# Patient Record
Sex: FEMALE | Race: WHITE | NOT HISPANIC OR LATINO | Employment: UNEMPLOYED | URBAN - METROPOLITAN AREA
[De-identification: names, ages, dates, MRNs, and addresses within clinical notes are randomized per-mention and may not be internally consistent; named-entity substitution may affect disease eponyms.]

---

## 2024-11-04 ENCOUNTER — PHARMACY VISIT (OUTPATIENT)
Dept: PHARMACY | Facility: MEDICAL CENTER | Age: 49
End: 2024-11-04
Payer: COMMERCIAL

## 2024-11-04 ENCOUNTER — HOSPITAL ENCOUNTER (OUTPATIENT)
Facility: MEDICAL CENTER | Age: 49
End: 2024-11-04
Attending: EMERGENCY MEDICINE | Admitting: HOSPITALIST
Payer: COMMERCIAL

## 2024-11-04 ENCOUNTER — APPOINTMENT (OUTPATIENT)
Dept: RADIOLOGY | Facility: MEDICAL CENTER | Age: 49
End: 2024-11-04
Attending: EMERGENCY MEDICINE
Payer: COMMERCIAL

## 2024-11-04 VITALS
DIASTOLIC BLOOD PRESSURE: 78 MMHG | WEIGHT: 179.68 LBS | TEMPERATURE: 98.4 F | RESPIRATION RATE: 18 BRPM | SYSTOLIC BLOOD PRESSURE: 125 MMHG | HEIGHT: 60 IN | BODY MASS INDEX: 35.28 KG/M2 | HEART RATE: 74 BPM | OXYGEN SATURATION: 94 %

## 2024-11-04 DIAGNOSIS — R93.5 ABNORMAL CT OF THE ABDOMEN: ICD-10-CM

## 2024-11-04 DIAGNOSIS — N13.39 OTHER HYDRONEPHROSIS: ICD-10-CM

## 2024-11-04 DIAGNOSIS — N20.0 NEPHROLITHIASIS: ICD-10-CM

## 2024-11-04 DIAGNOSIS — N20.1 URETEROLITHIASIS: ICD-10-CM

## 2024-11-04 PROBLEM — N13.30 HYDRONEPHROSIS: Status: ACTIVE | Noted: 2024-11-04

## 2024-11-04 LAB
ALBUMIN SERPL BCP-MCNC: 4.2 G/DL (ref 3.2–4.9)
ALBUMIN/GLOB SERPL: 1.3 G/DL
ALP SERPL-CCNC: 114 U/L (ref 30–99)
ALT SERPL-CCNC: 14 U/L (ref 2–50)
ANION GAP SERPL CALC-SCNC: 11 MMOL/L (ref 7–16)
APPEARANCE UR: CLEAR
AST SERPL-CCNC: 16 U/L (ref 12–45)
BACTERIA #/AREA URNS HPF: ABNORMAL /HPF
BASOPHILS # BLD AUTO: 0.7 % (ref 0–1.8)
BASOPHILS # BLD: 0.07 K/UL (ref 0–0.12)
BILIRUB SERPL-MCNC: 0.5 MG/DL (ref 0.1–1.5)
BILIRUB UR QL STRIP.AUTO: NEGATIVE
BUN SERPL-MCNC: 12 MG/DL (ref 8–22)
CALCIUM ALBUM COR SERPL-MCNC: 9.5 MG/DL (ref 8.5–10.5)
CALCIUM SERPL-MCNC: 9.7 MG/DL (ref 8.4–10.2)
CHLORIDE SERPL-SCNC: 104 MMOL/L (ref 96–112)
CO2 SERPL-SCNC: 21 MMOL/L (ref 20–33)
COLOR UR: YELLOW
CREAT SERPL-MCNC: 0.76 MG/DL (ref 0.5–1.4)
EKG IMPRESSION: NORMAL
EOSINOPHIL # BLD AUTO: 0.31 K/UL (ref 0–0.51)
EOSINOPHIL NFR BLD: 3 % (ref 0–6.9)
EPI CELLS #/AREA URNS HPF: ABNORMAL /HPF
ERYTHROCYTE [DISTWIDTH] IN BLOOD BY AUTOMATED COUNT: 43.5 FL (ref 35.9–50)
GFR SERPLBLD CREATININE-BSD FMLA CKD-EPI: 97 ML/MIN/1.73 M 2
GLOBULIN SER CALC-MCNC: 3.3 G/DL (ref 1.9–3.5)
GLUCOSE SERPL-MCNC: 105 MG/DL (ref 65–99)
GLUCOSE UR STRIP.AUTO-MCNC: NEGATIVE MG/DL
HCG SERPL QL: NEGATIVE
HCT VFR BLD AUTO: 44.7 % (ref 37–47)
HGB BLD-MCNC: 15.2 G/DL (ref 12–16)
IMM GRANULOCYTES # BLD AUTO: 0.04 K/UL (ref 0–0.11)
IMM GRANULOCYTES NFR BLD AUTO: 0.4 % (ref 0–0.9)
KETONES UR STRIP.AUTO-MCNC: NEGATIVE MG/DL
LEUKOCYTE ESTERASE UR QL STRIP.AUTO: NEGATIVE
LIPASE SERPL-CCNC: 27 U/L (ref 11–82)
LYMPHOCYTES # BLD AUTO: 2.26 K/UL (ref 1–4.8)
LYMPHOCYTES NFR BLD: 22.1 % (ref 22–41)
MCH RBC QN AUTO: 30.8 PG (ref 27–33)
MCHC RBC AUTO-ENTMCNC: 34 G/DL (ref 32.2–35.5)
MCV RBC AUTO: 90.5 FL (ref 81.4–97.8)
MICRO URNS: ABNORMAL
MONOCYTES # BLD AUTO: 0.85 K/UL (ref 0–0.85)
MONOCYTES NFR BLD AUTO: 8.3 % (ref 0–13.4)
NEUTROPHILS # BLD AUTO: 6.69 K/UL (ref 1.82–7.42)
NEUTROPHILS NFR BLD: 65.5 % (ref 44–72)
NITRITE UR QL STRIP.AUTO: NEGATIVE
NRBC # BLD AUTO: 0 K/UL
NRBC BLD-RTO: 0 /100 WBC (ref 0–0.2)
PH UR STRIP.AUTO: 6 [PH] (ref 5–8)
PLATELET # BLD AUTO: 318 K/UL (ref 164–446)
PMV BLD AUTO: 10.3 FL (ref 9–12.9)
POTASSIUM SERPL-SCNC: 3.6 MMOL/L (ref 3.6–5.5)
PROT SERPL-MCNC: 7.5 G/DL (ref 6–8.2)
PROT UR QL STRIP: NEGATIVE MG/DL
RBC # BLD AUTO: 4.94 M/UL (ref 4.2–5.4)
RBC # URNS HPF: ABNORMAL /HPF
RBC UR QL AUTO: ABNORMAL
SODIUM SERPL-SCNC: 136 MMOL/L (ref 135–145)
SP GR UR STRIP.AUTO: >=1.03
WBC # BLD AUTO: 10.2 K/UL (ref 4.8–10.8)
WBC #/AREA URNS HPF: ABNORMAL /HPF

## 2024-11-04 PROCEDURE — 87086 URINE CULTURE/COLONY COUNT: CPT

## 2024-11-04 PROCEDURE — 85025 COMPLETE CBC W/AUTO DIFF WBC: CPT

## 2024-11-04 PROCEDURE — 700105 HCHG RX REV CODE 258: Performed by: HOSPITALIST

## 2024-11-04 PROCEDURE — G0378 HOSPITAL OBSERVATION PER HR: HCPCS

## 2024-11-04 PROCEDURE — 36415 COLL VENOUS BLD VENIPUNCTURE: CPT

## 2024-11-04 PROCEDURE — 700111 HCHG RX REV CODE 636 W/ 250 OVERRIDE (IP): Mod: JZ | Performed by: HOSPITALIST

## 2024-11-04 PROCEDURE — 700102 HCHG RX REV CODE 250 W/ 637 OVERRIDE(OP): Performed by: INTERNAL MEDICINE

## 2024-11-04 PROCEDURE — 96375 TX/PRO/DX INJ NEW DRUG ADDON: CPT

## 2024-11-04 PROCEDURE — 700111 HCHG RX REV CODE 636 W/ 250 OVERRIDE (IP): Mod: JZ | Performed by: EMERGENCY MEDICINE

## 2024-11-04 PROCEDURE — 74177 CT ABD & PELVIS W/CONTRAST: CPT

## 2024-11-04 PROCEDURE — 80053 COMPREHEN METABOLIC PANEL: CPT

## 2024-11-04 PROCEDURE — 99418 PROLNG IP/OBS E/M EA 15 MIN: CPT | Performed by: INTERNAL MEDICINE

## 2024-11-04 PROCEDURE — 99204 OFFICE O/P NEW MOD 45 MIN: CPT | Performed by: UROLOGY

## 2024-11-04 PROCEDURE — 93005 ELECTROCARDIOGRAM TRACING: CPT

## 2024-11-04 PROCEDURE — 81001 URINALYSIS AUTO W/SCOPE: CPT

## 2024-11-04 PROCEDURE — RXMED WILLOW AMBULATORY MEDICATION CHARGE: Performed by: INTERNAL MEDICINE

## 2024-11-04 PROCEDURE — 700117 HCHG RX CONTRAST REV CODE 255: Performed by: EMERGENCY MEDICINE

## 2024-11-04 PROCEDURE — 700102 HCHG RX REV CODE 250 W/ 637 OVERRIDE(OP): Performed by: HOSPITALIST

## 2024-11-04 PROCEDURE — A9270 NON-COVERED ITEM OR SERVICE: HCPCS | Performed by: HOSPITALIST

## 2024-11-04 PROCEDURE — 93005 ELECTROCARDIOGRAM TRACING: CPT | Performed by: EMERGENCY MEDICINE

## 2024-11-04 PROCEDURE — A9270 NON-COVERED ITEM OR SERVICE: HCPCS | Performed by: INTERNAL MEDICINE

## 2024-11-04 PROCEDURE — 700105 HCHG RX REV CODE 258: Performed by: INTERNAL MEDICINE

## 2024-11-04 PROCEDURE — 99222 1ST HOSP IP/OBS MODERATE 55: CPT | Performed by: HOSPITALIST

## 2024-11-04 PROCEDURE — A9270 NON-COVERED ITEM OR SERVICE: HCPCS | Performed by: EMERGENCY MEDICINE

## 2024-11-04 PROCEDURE — 99285 EMERGENCY DEPT VISIT HI MDM: CPT

## 2024-11-04 PROCEDURE — 84703 CHORIONIC GONADOTROPIN ASSAY: CPT

## 2024-11-04 PROCEDURE — 700111 HCHG RX REV CODE 636 W/ 250 OVERRIDE (IP): Performed by: INTERNAL MEDICINE

## 2024-11-04 PROCEDURE — 83690 ASSAY OF LIPASE: CPT

## 2024-11-04 PROCEDURE — 700102 HCHG RX REV CODE 250 W/ 637 OVERRIDE(OP): Performed by: EMERGENCY MEDICINE

## 2024-11-04 PROCEDURE — 96374 THER/PROPH/DIAG INJ IV PUSH: CPT

## 2024-11-04 RX ORDER — ACETAMINOPHEN 325 MG/1
650 TABLET ORAL EVERY 6 HOURS PRN
Status: DISCONTINUED | OUTPATIENT
Start: 2024-11-04 | End: 2024-11-04 | Stop reason: HOSPADM

## 2024-11-04 RX ORDER — SODIUM CHLORIDE 9 MG/ML
INJECTION, SOLUTION INTRAVENOUS
Status: DISCONTINUED
Start: 2024-11-04 | End: 2024-11-04

## 2024-11-04 RX ORDER — PROMETHAZINE HYDROCHLORIDE 25 MG/1
12.5-25 SUPPOSITORY RECTAL EVERY 4 HOURS PRN
Status: DISCONTINUED | OUTPATIENT
Start: 2024-11-04 | End: 2024-11-04 | Stop reason: HOSPADM

## 2024-11-04 RX ORDER — ONDANSETRON 2 MG/ML
4 INJECTION INTRAMUSCULAR; INTRAVENOUS EVERY 4 HOURS PRN
Status: DISCONTINUED | OUTPATIENT
Start: 2024-11-04 | End: 2024-11-04 | Stop reason: HOSPADM

## 2024-11-04 RX ORDER — ONDANSETRON 4 MG/1
4 TABLET, ORALLY DISINTEGRATING ORAL EVERY 4 HOURS PRN
Status: DISCONTINUED | OUTPATIENT
Start: 2024-11-04 | End: 2024-11-04 | Stop reason: HOSPADM

## 2024-11-04 RX ORDER — KETOROLAC TROMETHAMINE 10 MG/1
10 TABLET, FILM COATED ORAL EVERY 6 HOURS PRN
Qty: 20 TABLET | Refills: 0 | Status: SHIPPED | OUTPATIENT
Start: 2024-11-04 | End: 2024-11-09

## 2024-11-04 RX ORDER — SODIUM CHLORIDE 9 MG/ML
500 INJECTION, SOLUTION INTRAVENOUS ONCE
Status: COMPLETED | OUTPATIENT
Start: 2024-11-04 | End: 2024-11-04

## 2024-11-04 RX ORDER — IBUPROFEN 200 MG
400-800 TABLET ORAL EVERY 6 HOURS PRN
Status: ON HOLD | COMMUNITY
End: 2024-11-04

## 2024-11-04 RX ORDER — HYDROMORPHONE HYDROCHLORIDE 1 MG/ML
0.5 INJECTION, SOLUTION INTRAMUSCULAR; INTRAVENOUS; SUBCUTANEOUS
Status: DISCONTINUED | OUTPATIENT
Start: 2024-11-04 | End: 2024-11-04

## 2024-11-04 RX ORDER — PROCHLORPERAZINE EDISYLATE 5 MG/ML
5-10 INJECTION INTRAMUSCULAR; INTRAVENOUS EVERY 4 HOURS PRN
Status: DISCONTINUED | OUTPATIENT
Start: 2024-11-04 | End: 2024-11-04 | Stop reason: HOSPADM

## 2024-11-04 RX ORDER — PROMETHAZINE HYDROCHLORIDE 25 MG/1
12.5-25 TABLET ORAL EVERY 4 HOURS PRN
Status: DISCONTINUED | OUTPATIENT
Start: 2024-11-04 | End: 2024-11-04 | Stop reason: HOSPADM

## 2024-11-04 RX ORDER — KETOROLAC TROMETHAMINE 15 MG/ML
15 INJECTION, SOLUTION INTRAMUSCULAR; INTRAVENOUS EVERY 6 HOURS
Status: DISCONTINUED | OUTPATIENT
Start: 2024-11-04 | End: 2024-11-04 | Stop reason: HOSPADM

## 2024-11-04 RX ORDER — ACETAMINOPHEN 500 MG
500 TABLET ORAL EVERY 6 HOURS PRN
COMMUNITY

## 2024-11-04 RX ORDER — HYDROMORPHONE HYDROCHLORIDE 1 MG/ML
1 INJECTION, SOLUTION INTRAMUSCULAR; INTRAVENOUS; SUBCUTANEOUS
Status: DISCONTINUED | OUTPATIENT
Start: 2024-11-04 | End: 2024-11-04

## 2024-11-04 RX ORDER — KETOROLAC TROMETHAMINE 10 MG/1
10 TABLET, FILM COATED ORAL EVERY 6 HOURS PRN
Qty: 20 TABLET | Refills: 0 | Status: SHIPPED | OUTPATIENT
Start: 2024-11-04 | End: 2024-11-04

## 2024-11-04 RX ORDER — ALUMINA, MAGNESIA, AND SIMETHICONE 2400; 2400; 240 MG/30ML; MG/30ML; MG/30ML
20 SUSPENSION ORAL ONCE
Status: COMPLETED | OUTPATIENT
Start: 2024-11-04 | End: 2024-11-04

## 2024-11-04 RX ORDER — TAMSULOSIN HYDROCHLORIDE 0.4 MG/1
0.4 CAPSULE ORAL
Status: DISCONTINUED | OUTPATIENT
Start: 2024-11-04 | End: 2024-11-04 | Stop reason: HOSPADM

## 2024-11-04 RX ORDER — TAMSULOSIN HYDROCHLORIDE 0.4 MG/1
0.4 CAPSULE ORAL
Qty: 30 CAPSULE | Refills: 1 | Status: SHIPPED | OUTPATIENT
Start: 2024-11-05 | End: 2024-12-05

## 2024-11-04 RX ORDER — KETOROLAC TROMETHAMINE 15 MG/ML
15 INJECTION, SOLUTION INTRAMUSCULAR; INTRAVENOUS ONCE
Status: COMPLETED | OUTPATIENT
Start: 2024-11-04 | End: 2024-11-04

## 2024-11-04 RX ORDER — POTASSIUM CITRATE 10 MEQ/1
10 TABLET, EXTENDED RELEASE ORAL 2 TIMES DAILY
Qty: 60 TABLET | Refills: 1 | Status: SHIPPED | OUTPATIENT
Start: 2024-11-04 | End: 2025-01-03

## 2024-11-04 RX ORDER — ONDANSETRON 2 MG/ML
4 INJECTION INTRAMUSCULAR; INTRAVENOUS ONCE
Status: COMPLETED | OUTPATIENT
Start: 2024-11-04 | End: 2024-11-04

## 2024-11-04 RX ADMIN — IOHEXOL 100 ML: 350 INJECTION, SOLUTION INTRAVENOUS at 03:22

## 2024-11-04 RX ADMIN — KETOROLAC TROMETHAMINE 15 MG: 15 INJECTION, SOLUTION INTRAMUSCULAR; INTRAVENOUS at 12:16

## 2024-11-04 RX ADMIN — CEFAZOLIN 1 G: 1 INJECTION, POWDER, FOR SOLUTION INTRAMUSCULAR; INTRAVENOUS at 12:27

## 2024-11-04 RX ADMIN — TAMSULOSIN HYDROCHLORIDE 0.4 MG: 0.4 CAPSULE ORAL at 12:15

## 2024-11-04 RX ADMIN — ACETAMINOPHEN 650 MG: 325 TABLET ORAL at 10:33

## 2024-11-04 RX ADMIN — ONDANSETRON 4 MG: 2 INJECTION INTRAMUSCULAR; INTRAVENOUS at 06:40

## 2024-11-04 RX ADMIN — KETOROLAC TROMETHAMINE 15 MG: 15 INJECTION, SOLUTION INTRAMUSCULAR; INTRAVENOUS at 02:18

## 2024-11-04 RX ADMIN — ALUMINUM HYDROXIDE, MAGNESIUM HYDROXIDE, AND DIMETHICONE 20 ML: 400; 400; 40 SUSPENSION ORAL at 02:17

## 2024-11-04 RX ADMIN — KETOROLAC TROMETHAMINE 15 MG: 15 INJECTION, SOLUTION INTRAMUSCULAR; INTRAVENOUS at 06:40

## 2024-11-04 RX ADMIN — SODIUM CHLORIDE 500 ML: 9 INJECTION, SOLUTION INTRAVENOUS at 08:26

## 2024-11-04 RX ADMIN — ONDANSETRON 4 MG: 2 INJECTION INTRAMUSCULAR; INTRAVENOUS at 02:17

## 2024-11-04 SDOH — ECONOMIC STABILITY: TRANSPORTATION INSECURITY
IN THE PAST 12 MONTHS, HAS THE LACK OF TRANSPORTATION KEPT YOU FROM MEDICAL APPOINTMENTS OR FROM GETTING MEDICATIONS?: NO

## 2024-11-04 SDOH — ECONOMIC STABILITY: TRANSPORTATION INSECURITY
IN THE PAST 12 MONTHS, HAS LACK OF RELIABLE TRANSPORTATION KEPT YOU FROM MEDICAL APPOINTMENTS, MEETINGS, WORK OR FROM GETTING THINGS NEEDED FOR DAILY LIVING?: NO

## 2024-11-04 ASSESSMENT — FIBROSIS 4 INDEX: FIB4 SCORE: 0.62

## 2024-11-04 ASSESSMENT — SOCIAL DETERMINANTS OF HEALTH (SDOH)
IN THE PAST 12 MONTHS, HAS THE ELECTRIC, GAS, OIL, OR WATER COMPANY THREATENED TO SHUT OFF SERVICE IN YOUR HOME?: NO
WITHIN THE LAST YEAR, HAVE YOU BEEN HUMILIATED OR EMOTIONALLY ABUSED IN OTHER WAYS BY YOUR PARTNER OR EX-PARTNER?: NO
WITHIN THE PAST 12 MONTHS, YOU WORRIED THAT YOUR FOOD WOULD RUN OUT BEFORE YOU GOT THE MONEY TO BUY MORE: NEVER TRUE
WITHIN THE PAST 12 MONTHS, THE FOOD YOU BOUGHT JUST DIDN'T LAST AND YOU DIDN'T HAVE MONEY TO GET MORE: NEVER TRUE
WITHIN THE LAST YEAR, HAVE YOU BEEN AFRAID OF YOUR PARTNER OR EX-PARTNER?: NO
WITHIN THE LAST YEAR, HAVE TO BEEN RAPED OR FORCED TO HAVE ANY KIND OF SEXUAL ACTIVITY BY YOUR PARTNER OR EX-PARTNER?: NO
WITHIN THE LAST YEAR, HAVE YOU BEEN KICKED, HIT, SLAPPED, OR OTHERWISE PHYSICALLY HURT BY YOUR PARTNER OR EX-PARTNER?: NO

## 2024-11-04 ASSESSMENT — ENCOUNTER SYMPTOMS
PALPITATIONS: 0
FLANK PAIN: 1
SHORTNESS OF BREATH: 0
DOUBLE VISION: 0
DEPRESSION: 0
FEVER: 0
INSOMNIA: 0
HEADACHES: 0
SORE THROAT: 0
NECK PAIN: 0
ABDOMINAL PAIN: 1
WEAKNESS: 0
COUGH: 0
BLURRED VISION: 0
DIZZINESS: 0
NAUSEA: 1
BRUISES/BLEEDS EASILY: 0
VOMITING: 0
CHILLS: 1
MYALGIAS: 0

## 2024-11-04 ASSESSMENT — PATIENT HEALTH QUESTIONNAIRE - PHQ9
SUM OF ALL RESPONSES TO PHQ9 QUESTIONS 1 AND 2: 0
1. LITTLE INTEREST OR PLEASURE IN DOING THINGS: NOT AT ALL
2. FEELING DOWN, DEPRESSED, IRRITABLE, OR HOPELESS: NOT AT ALL

## 2024-11-04 ASSESSMENT — COGNITIVE AND FUNCTIONAL STATUS - GENERAL
SUGGESTED CMS G CODE MODIFIER DAILY ACTIVITY: CH
MOBILITY SCORE: 24
SUGGESTED CMS G CODE MODIFIER MOBILITY: CH
DAILY ACTIVITIY SCORE: 24

## 2024-11-04 ASSESSMENT — PAIN DESCRIPTION - PAIN TYPE
TYPE: ACUTE PAIN

## 2024-11-04 ASSESSMENT — PAIN DESCRIPTION - DESCRIPTORS: DESCRIPTORS: CRAMPING

## 2024-11-04 ASSESSMENT — LIFESTYLE VARIABLES
TOTAL SCORE: 0
CONSUMPTION TOTAL: NEGATIVE
ALCOHOL_USE: YES
DOES PATIENT WANT TO STOP DRINKING: NO
TOTAL SCORE: 0
EVER FELT BAD OR GUILTY ABOUT YOUR DRINKING: NO
HOW MANY TIMES IN THE PAST YEAR HAVE YOU HAD 5 OR MORE DRINKS IN A DAY: 0
HAVE PEOPLE ANNOYED YOU BY CRITICIZING YOUR DRINKING: NO
AVERAGE NUMBER OF DAYS PER WEEK YOU HAVE A DRINK CONTAINING ALCOHOL: 0
HAVE YOU EVER FELT YOU SHOULD CUT DOWN ON YOUR DRINKING: NO
TOTAL SCORE: 0
ON A TYPICAL DAY WHEN YOU DRINK ALCOHOL HOW MANY DRINKS DO YOU HAVE: 1
EVER HAD A DRINK FIRST THING IN THE MORNING TO STEADY YOUR NERVES TO GET RID OF A HANGOVER: NO

## 2024-11-04 NOTE — ED TRIAGE NOTES
"Chief Complaint   Patient presents with    Abdominal Pain    Flank Pain    Headache     Pt walks in with c/o lt flank back pain,  lower chest and abd pain and pain in chest that increases with  deep breathing. Pt states she has had a \"terrible\" HA for 3-4 days and is nauseates at this time.     BP (!) 141/89   Pulse 93   Temp 36.4 °C (97.6 °F) (Temporal)   Resp 18   Ht 1.524 m (5')   Wt 82.1 kg (181 lb)   SpO2 97%   BMI 35.35 kg/m²     "

## 2024-11-04 NOTE — ED PROVIDER NOTES
"ED Provider Note        CHIEF COMPLAINT  Chief Complaint   Patient presents with    Abdominal Pain    Flank Pain    Headache     Pt walks in with c/o lt flank back pain,  lower chest and abd pain and pain in chest that increases with  deep breathing. Pt states she has had a \"terrible\" HA for 3-4 days and is nauseates at this time.         HPI      Stephanie Gail Schoenfeldt is a 48 y.o. female who presents to the Emergency Department with left-sided abdominal pain.  The patient reports that for approximately 4 days she has been having worsening pain after eating.  She also endorses nausea but denies any vomiting.  She is recently and having some migraine headaches.  Her symptoms improved within the past 24 hours and is having no pain until the afternoon when she began having some mild abdominal discomfort which markedly worsened around midnight when she was feeling hot and had severe pain in the left-hand side.  She is taken 1 tablet of ibuprofen once or twice a day for her symptoms but no other medications.  She denies any melena, hematochezia, dysuria, hematuria.  She does have a history of kidney stones but this does not feel similar to that.    REVIEW OF SYSTEMS  See HPI for further details. All other systems are negative.     PAST MEDICAL HISTORY   History reviewed. No pertinent past medical history.    SURGICAL HISTORY  History reviewed. No pertinent surgical history.    FAMILY HISTORY  History reviewed. No pertinent family history.    SOCIAL HISTORY        CURRENT MEDICATIONS  Home Medications       Reviewed by Coby Amos R.N. (Registered Nurse) on 11/04/24 at 0159  Med List Status: <None>     Medication Last Dose Status        Patient Brett Taking any Medications                         Audit from Redirected Encounters    **Home medications have not yet been reviewed for this encounter**         ALLERGIES  Allergies   Allergen Reactions    Morphine Unspecified     Chest pain       PHYSICAL EXAM  VITAL " SIGNS: /83   Pulse 82   Temp 36.8 °C (98.2 °F)   Resp 18   Ht 1.524 m (5')   Wt 82.1 kg (181 lb)   SpO2 94%   BMI 35.35 kg/m²   Gen: Alert, uncomfortable appearing  HEENT: ATNC  Eyes: PERRL, EOMI, normal conjunctiva  Neck: trachea midline  Resp: no respiratory distress  CV: No JVD, regular rate and rhythm  Abd: non-distended, soft, left upper quadrant tenderness, minimal right upper quadrant tenderness.  No rebound or guarding.  : No CVAT  Ext: No deformities  Neuro: speech fluent    DIAGNOSTIC STUDIES / PROCEDURES  EKG  Results for orders placed or performed during the hospital encounter of 24   EKG (Now)   Result Value Ref Range    Report       [facility] Emergency Dept.    Test Date:  2024  Pt Name:    STEPHANIE SCHOENFELDT        Department: [facility]  MRN:        [id number]                      [facility]  Gender:     Female                       Technician: [id number]  :        1975                   Requested By:ER TRIAGE PROTOCOL  Order #:    [id number]                    Reading MD: Anjum Keenan    Measurements  Intervals                                Axis  Rate:       84                           P:          28  MD:         152                          QRS:        28  QRSD:       96                           T:          34  QT:         355  QTc:        420    Interpretive Statements  Sinus rhythm  Probable left atrial enlargement  Low voltage, precordial leads  Baseline wander in lead(s) I,II,aVR,aVL,aVF  No previous ECG available for comparison  Electronically Signed On 2024 02:34:51 PST by Anjum Keenan       I independently interpreted this EKG    LABS  Labs Reviewed   COMP METABOLIC PANEL - Abnormal; Notable for the following components:       Result Value    Glucose 105 (*)     Alkaline Phosphatase 114 (*)     All other components within normal limits   URINALYSIS - Abnormal; Notable for the following components:    Occult Blood Large (*)      All other components within normal limits   URINE MICROSCOPIC (W/UA) - Abnormal; Notable for the following components:    Bacteria Moderate (*)     All other components within normal limits   CBC WITH DIFFERENTIAL   LIPASE   HCG QUAL SERUM   ESTIMATED GFR         RADIOLOGY  I have independently interpreted the diagnostic imaging associated with this visit.  My preliminary interpretation is as follows: Proximal left ureteral stone  Radiologist interpretation:    CT-ABDOMEN-PELVIS WITH   Final Result         1. 5 mm proximal left ureteral stone.   2. 6 mm nonobstructing left renal stone.   3. There are few sclerotic foci within the spine and pelvis, bone islands versus other. Recommend follow-up bone scan.          COURSE & MEDICAL DECISION MAKING  Pertinent Labs & Imaging studies were reviewed. (See chart for details)      INITIAL ASSESSMENT AND PLAN  Care Narrative: Patient presents with left upper quadrant abdominal pain and tenderness, initially worse after eating.  Not obviously peritonitic on exam.  There is no CVA tenderness for pyelonephritis and urinalysis demonstrates bacteria but no elevated white blood cells.  The patient does have elevated red blood cells however this is not flag red on the urinalysis result.  The patient does report that she always has stones within her kidney and has chronic hematuria, has seen urology in the past but not for the past 3 years since she has been here in [place].      She demonstrates no leukocytosis, no evidence of anemia.  No elevated lipase for pancreatitis.  No significant LFT abnormalities to suggest acute cholecystitis, ascending cholangitis.  No electrolyte derangements.    After Maalox, ketorolac, her pain is 1/10 in severity.    The patient's CAT scan demonstrates a proximal ureteral stone.  In discussing with the patient, she has required multiple urologic interventions in the past including for proximal stones and has a history of recurrent infected stones.  Given  this and the unlikely probability the patient will be able to pass the stone on her own given 4 days of symptoms and the stone remains proximal, the patient was offered either outpatient close follow-up or hospitalization and she elects for hospitalization for urgent intervention.    ADDITIONAL PROBLEM LIST AND DISPOSITION  I have discussed management of the patient with the following medical professionals: See below    Barriers to care at this time, including but not limited to: Patient does not have established PCP.     Patient is referred to primary care provider for blood pressure, diabetes and all other preventative health services.  Patient was given return precautions, anticipatory guidance, and the opportunity ask questions prior to discharge        FINAL IMPRESSION  1. Ureterolithiasis    2. Abnormal CT of the abdomen        DISPOSITION:    Case discussed with Dr. Mitchell , who will evaluate the patient for hospitalization. Patient will be hospitalized in guarded condition.        This dictation was created using voice recognition software. The accuracy of the dictation is limited to the abilities of the software. I expect there may be some errors of grammar and possibly content. The nursing notes were reviewed and certain aspects of this information were incorporated into this note.

## 2024-11-04 NOTE — CARE PLAN
The patient is Stable - Low risk of patient condition declining or worsening         Progress made toward(s) clinical / shift goals:  monitor pain level  Problem: Pain - Standard  Goal: Alleviation of pain or a reduction in pain to the patient’s comfort goal  11/4/2024 0953 by Sagrario Camacho R.N.  Outcome: Progressing  11/4/2024 0952 by Sagrario Camacho R.N.  Outcome: Progressing     Problem: Knowledge Deficit - Standard  Goal: Patient and family/care givers will demonstrate understanding of plan of care, disease process/condition, diagnostic tests and medications  11/4/2024 0953 by Sagrario Camacho R.N.  Outcome: Progressing  11/4/2024 0952 by Sagrario Camacho R.N.  Outcome: Progressing     Problem: Psychosocial  Goal: Patient's level of anxiety will decrease  11/4/2024 0953 by Sagrario Camacho R.N.  Outcome: Progressing  11/4/2024 0952 by Sagrario Camacho R.N.  Reactivated  Goal: Patient's ability to verbalize feelings about condition will improve  11/4/2024 0953 by Sagrario Camacho R.N.  Outcome: Progressing  11/4/2024 0952 by Sagrario Camacho R.N.  Reactivated  Goal: Patient's ability to re-evaluate and adapt role responsibilities will improve  11/4/2024 0953 by Sagrario Camacho R.N.  Outcome: Progressing  11/4/2024 0952 by Sagrario Camacho R.N.  Reactivated  Goal: Patient and family will demonstrate ability to cope with life altering diagnosis and/or procedure  11/4/2024 0953 by Sagrario Camacho R.N.  Outcome: Progressing  11/4/2024 0952 by Sagrario Camacho R.N.  Reactivated  Goal: Spiritual and cultural needs incorporated into hospitalization  11/4/2024 0953 by Sagrario Camacho R.N.  Outcome: Progressing  11/4/2024 0952 by Sagrario Camacho R.N.  Reactivated       Patient is not progressing towards the following goals:

## 2024-11-04 NOTE — PROGRESS NOTES
Hospital Medicine Daily Progress Note    Date of Service  11/4/2024    Patient stating she is having ongoing left flank pain but tolerable with IV Toradol.  She is reporting severe headaches.  UA showing bacteria but no WBC.  Urine appears to be cloudy in bathroom toilet hat.  Patient reporting significant history of nephrolithiasis, prior procedures with cystoscopies, lithotripsy, ureteral stents.  Her last ureteral stents were placed bilaterally in [facility], about 4 years ago now.  She has not needed a urologist in [place] since moving here about 3 years ago.  I reviewed CT scan, there is a 5 mm left ureteral stone, 6 mm left renal stone.  There is minimal hydronephrosis.  I consulted [facility] urology for evaluation.   Keep n.p.o. until evaluation  Hold further IV fluids  I am starting IV Ancef, and preparation if patient has infection proximal to the ureteral stone.    Principal Problem:    Nephrolithiasis (POA: Yes)  Active Problems:    Hydronephrosis (POA: Unknown)  Resolved Problems:    * No resolved hospital problems. *        I spent an extra 15 minutes at patient's bedside discussing her case, therapy needs and consultation with urology

## 2024-11-04 NOTE — H&P
"Hospital Medicine History & Physical Note    Date of Service  11/4/2024    Primary Care Physician  Pcp Pt States None    Consultants  None    Code Status  Full Code    Chief Complaint  Chief Complaint   Patient presents with    Abdominal Pain    Flank Pain    Headache     Pt walks in with c/o lt flank back pain,  lower chest and abd pain and pain in chest that increases with  deep breathing. Pt states she has had a \"terrible\" HA for 3-4 days and is nauseates at this time.       History of Presenting Illness  Stephanie Gail Schoenfeldt is a 48 y.o. female, who presented to the emergency department on 11/4/2024 with complaints of acute left flank pain that is started 5 days ago and is getting progressively worse.  She has associated nausea.  Pain is sharp in nature, worse with deep inspiration.  She denies having fevers, no dysuria, but just noticed hematuria last time she used the restroom. She had multiple kidney stones, prior h/o 10 procedure including Ureteral stent , last episode 4 years ago. She was previously leaving in [facility]. Moved to [place] 3 years ago.     I discussed the plan of care with patient and ERP Dr. Keenan .    Review of Systems  Review of Systems   Constitutional:  Positive for chills and malaise/fatigue. Negative for fever.   HENT:  Negative for congestion and sore throat.    Eyes:  Negative for blurred vision and double vision.   Respiratory:  Negative for cough and shortness of breath.    Cardiovascular:  Negative for chest pain and palpitations.   Gastrointestinal:  Positive for abdominal pain and nausea. Negative for vomiting.   Genitourinary:  Positive for flank pain. Negative for dysuria and urgency.   Musculoskeletal:  Negative for myalgias and neck pain.   Skin:  Negative for itching and rash.   Neurological:  Negative for dizziness, weakness and headaches.   Endo/Heme/Allergies:  Does not bruise/bleed easily.   Psychiatric/Behavioral:  Negative for depression. The patient does not " have insomnia.        Past Medical History  Kidney stones    Surgical History  Lithotripsy,  ureteral stents    Family History  Reviewed and not pertinent  Family history reviewed with patient. There is no family history that is pertinent to the chief complaint.     Social History   Denies smoking tobacco.  Does not drink daily.  Also denies recreational drug use.    Allergies  Allergies   Allergen Reactions    Morphine Unspecified     Chest pain       Medications  None       Physical Exam  Temp:  [36.4 °C (97.6 °F)-36.8 °C (98.2 °F)] 36.8 °C (98.2 °F)  Pulse:  [82-93] 82  Resp:  [18] 18  BP: (124-141)/(83-89) 124/83  SpO2:  [94 %-97 %] 94 %  Blood Pressure: 124/83   Temperature: 36.8 °C (98.2 °F)   Pulse: 82   Respiration: 18   Pulse Oximetry: 94 %       Physical Exam  Constitutional:       Appearance: Normal appearance.   HENT:      Head: Normocephalic and atraumatic.      Mouth/Throat:      Mouth: Mucous membranes are moist.      Pharynx: Oropharynx is clear.   Eyes:      Extraocular Movements: Extraocular movements intact.      Pupils: Pupils are equal, round, and reactive to light.   Cardiovascular:      Rate and Rhythm: Normal rate and regular rhythm.      Heart sounds: Normal heart sounds.   Pulmonary:      Effort: Pulmonary effort is normal.      Breath sounds: Normal breath sounds.   Abdominal:      General: Abdomen is flat. Bowel sounds are normal.      Palpations: Abdomen is soft.      Tenderness: There is left CVA tenderness.   Musculoskeletal:      Cervical back: Normal range of motion and neck supple.   Skin:     General: Skin is warm and dry.   Neurological:      General: No focal deficit present.      Mental Status: She is alert and oriented to person, place, and time.   Psychiatric:         Mood and Affect: Mood normal.         Behavior: Behavior normal.         Laboratory:  Recent Labs     11/04/24  0145   WBC 10.2   RBC 4.94   HEMOGLOBIN 15.2   HEMATOCRIT 44.7   MCV 90.5   MCH 30.8   MCHC 34.0    RDW 43.5   PLATELETCT 318   MPV 10.3     Recent Labs     11/04/24  0145   SODIUM 136   POTASSIUM 3.6   CHLORIDE 104   CO2 21   GLUCOSE 105*   BUN 12   CREATININE 0.76   CALCIUM 9.7     Recent Labs     11/04/24  0145   ALTSGPT 14   ASTSGOT 16   ALKPHOSPHAT 114*   TBILIRUBIN 0.5   LIPASE 27   GLUCOSE 105*             Imaging:  CT-ABDOMEN-PELVIS WITH   Final Result         1. 5 mm proximal left ureteral stone.   2. 6 mm nonobstructing left renal stone.   3. There are few sclerotic foci within the spine and pelvis, bone islands versus other. Recommend follow-up bone scan.          X-Ray:  I have personally reviewed the images and compared with prior images. and My impression is: CT of the abdomen and pelvis is showing 5 mm stone proximal to the left ureter.  She has minimal hydronephrosis.  There is also a 6 mm nonobstructing left renal stone seen.    Assessment/Plan:  Justification for Admission Status  I anticipate this patient is appropriate for observation status at this time because 48-year-old female with urolithiasis.  And intractable pain        * Nephrolithiasis- (present on admission)  Assessment & Plan  -Observation status to medical floor.  -Patient with 6 mm stone to the left proximal ureter. Symptoms now for 5 days. Multiple kidney stones in the past requiring procedures.  -She has intractable pain in spite of receiving IV narcotics.  -There is no UTI, no SIRS criteria.  -Allergic to IV narcotics. I scheduled Toradol Q 6 hours for pain control  -Please call Urology consult in am      Hydronephrosis  Assessment & Plan  -Minimal secondary to obstructive changes.  -Plan as above.        VTE prophylaxis: SCDs/TEDs

## 2024-11-04 NOTE — ASSESSMENT & PLAN NOTE
-Observation status to medical floor.  -Patient with 6 mm stone to the left proximal ureter. Symptoms now for 5 days. Multiple kidney stones in the past requiring procedures.  -She has intractable pain in spite of receiving IV narcotics.  -There is no UTI, no SIRS criteria.  -Allergic to IV narcotics. I scheduled Toradol Q 6 hours for pain control  -Please call Urology consult in am

## 2024-11-04 NOTE — ED NOTES
Patient ambulates to the ER accompanied by her  complaining of LLQ abdominal pain the radiates to her left flank. The patient reports that her pain increases when she eats food and that but denies nausea or vomiting. She reports a history of kidney stones but denies noticing any urinary abnormalities.

## 2024-11-04 NOTE — PROGRESS NOTES
4 Eyes Skin Assessment Completed by VIRIDIANA martinez and VIRIDIANA white.    Head WDL  Ears WDL  Nose WDL  Mouth WDL  Neck WDL  Breast/Chest WDL  Shoulder Blades WDL  Spine WDL  (R) Arm/Elbow/Hand WDL  (L) Arm/Elbow/Hand WDL  Abdomen WDL  Groin WDL  Scrotum/Coccyx/Buttocks WDL  (R) Leg WDL  (L) Leg WDL  (R) Heel/Foot/Toe WDL  (L) Heel/Foot/Toe WDL          Devices In Places       Interventions In Place N/A    Possible Skin Injury No    Pictures Uploaded Into Epic N/A  Wound Consult Placed N/A  RN Wound Prevention Protocol Ordered No

## 2024-11-05 ENCOUNTER — TELEPHONE (OUTPATIENT)
Dept: UROLOGY | Facility: MEDICAL CENTER | Age: 49
End: 2024-11-05
Payer: COMMERCIAL

## 2024-11-05 ENCOUNTER — PATIENT MESSAGE (OUTPATIENT)
Dept: UROLOGY | Facility: MEDICAL CENTER | Age: 49
End: 2024-11-05
Payer: COMMERCIAL

## 2024-11-05 DIAGNOSIS — N20.0 NEPHROLITHIASIS: ICD-10-CM

## 2024-11-05 NOTE — PROGRESS NOTES
Patient discharged via wheelchair with spouse.  Discharge documents, medication prescriptions given at bedside, and discharge instructions given.

## 2024-11-05 NOTE — DISCHARGE INSTRUCTIONS
Avoid iron pills, typically contains Oxylate which can form renal stones (Calcium-Oxylate stones).  Please stay hydrated.

## 2024-11-05 NOTE — TELEPHONE ENCOUNTER
VOICEMAIL  1. Caller Name: Stephanie Gail Schoenfeldt                      Call Back Number: [phone]    2. Message: pt called and lm wanting to know what diet she should be following for her kidney stones.    3. Patient approves office to leave a detailed voicemail/MyChart message: yes

## 2024-11-05 NOTE — DISCHARGE SUMMARY
"Discharge Summary    CHIEF COMPLAINT ON ADMISSION  Chief Complaint   Patient presents with    Abdominal Pain    Flank Pain    Headache     Pt walks in with c/o lt flank back pain,  lower chest and abd pain and pain in chest that increases with  deep breathing. Pt states she has had a \"terrible\" HA for 3-4 days and is nauseates at this time.       Reason for Admission  Flank Pain     Admission Date  11/4/2024    CODE STATUS  Full Code    HPI & HOSPITAL COURSE  This is \"Stephanie Gail Schoenfeldt is a 48 y.o. female, who presented to the emergency department on 11/4/2024 with complaints of acute left flank pain that is started 5 days ago and is getting progressively worse.  She has associated nausea.  Pain is sharp in nature, worse with deep inspiration.  She denies having fevers, no dysuria, but just noticed hematuria last time she used the restroom. She had multiple kidney stones, prior h/o 10 procedure including Ureteral stent , last episode 4 years ago. She was previously leaving in [facility]. Moved to [place] 3 years ago.\" (As per admitting physician Dr. Mitchell on H&P).    Patient stating she is having ongoing left flank pain but tolerable with IV Toradol.  She is reporting severe headaches.  UA showing bacteria but no WBC.  Urine appears to be cloudy in bathroom toilet hat.    Patient reporting significant history of nephrolithiasis, prior procedures with cystoscopies, lithotripsy, ureteral stents.  Her last ureteral stents were placed bilaterally in [facility], about 4 years ago now.  She has not needed a urologist in [place] since moving here about 3 years ago.    I reviewed CT scan, there is a 5 mm left ureteral stone, 6 mm left renal stone.  There is minimal hydronephrosis.    I consulted [facility] urology, discussed with Dr. Craig Nathan.  Patient is doing well, her stone has a good chance to pass.  He recommended to continue on tamsulosin for 30 days, potassium citrate 15 mEq twice daily for 30 days, " Toradol short course for pain control.    I was able to prescribe patient tamsulosin and Toradol.  Our pharmacy was caring for potassium citrate 10mEq which I ordered.       Therefore, she is discharged in good and stable condition to home with close outpatient follow-up.  Admitted and discharged same day, under observation.    The patient recovered much more quickly than anticipated on admission.    Discharge Date  11/4/2024    FOLLOW UP ITEMS POST DISCHARGE  With primary care provider  With [facility]    DISCHARGE DIAGNOSES  Principal Problem:    Nephrolithiasis (POA: Yes)  Active Problems:    Hydronephrosis (POA: Yes)    Left ureteral stone (POA: Yes)  Resolved Problems:    * No resolved hospital problems. *      FOLLOW UP  No future appointments.  Craig Nathan M.D.  [address] [place] [address] [address] [place] [phone] [phone]    Call  Please call to ensure you have your appointment set up to follow-up the left ureteral stone      MEDICATIONS ON DISCHARGE     Medication List        START taking these medications        Instructions   ketorolac 10 MG Tabs  Commonly known as: Toradol   Take 1 Tablet by mouth every 6 hours as needed for Severe Pain for up to 5 days.  Dose: 10 mg     potassium citrate SR 10 MEQ (1080 MG) Tbcr  Commonly known as: Urocit-K SR   Take 1 Tablet by mouth 2 times a day for 30 days.  Dose: 10 mEq     tamsulosin 0.4 MG capsule  Start taking on: November 5, 2024  Commonly known as: Flomax   Take 1 Capsule by mouth 1/2 hour after breakfast for 30 days.  Dose: 0.4 mg            CONTINUE taking these medications        Instructions   acetaminophen 500 MG Tabs  Commonly known as: Tylenol   Take 500 mg by mouth every 6 hours as needed. Indications: Pain  Dose: 500 mg            STOP taking these medications      BISMUTH SUBSALICYLATE PO     COLLAGEN PO     ibuprofen 200 MG Tabs  Commonly known as: Motrin     IRON PO              Allergies  Allergies   Allergen Reactions    Dilaudid  [Hydromorphone] Vomiting    Morphine Unspecified     Chest pain       DIET  Orders Placed This Encounter   Procedures    Diet Order Diet: Regular     Standing Status:   Standing     Number of Occurrences:   1     Order Specific Question:   Diet:     Answer:   Regular [1]       ACTIVITY  As tolerated.  Weight bearing as tolerated    CONSULTATIONS  [facility]    PROCEDURES  None    LABORATORY  Lab Results   Component Value Date    SODIUM 136 11/04/2024    POTASSIUM 3.6 11/04/2024    CHLORIDE 104 11/04/2024    CO2 21 11/04/2024    GLUCOSE 105 (H) 11/04/2024    BUN 12 11/04/2024    CREATININE 0.76 11/04/2024        Lab Results   Component Value Date    WBC 10.2 11/04/2024    HEMOGLOBIN 15.2 11/04/2024    HEMATOCRIT 44.7 11/04/2024    PLATELETCT 318 11/04/2024      CT-ABDOMEN-PELVIS WITH   Final Result         1. 5 mm proximal left ureteral stone.   2. 6 mm nonobstructing left renal stone.   3. There are few sclerotic foci within the spine and pelvis, bone islands versus other. Recommend follow-up bone scan.          Total time of the discharge process exceeds 51 minutes.

## 2024-11-05 NOTE — CARE PLAN
The patient is Stable - Low risk of patient condition declining or worsening         Progress made toward(s) clinical / shift goals:  pt discharged    Problem: Pain - Standard  Goal: Alleviation of pain or a reduction in pain to the patient’s comfort goal  11/4/2024 1635 by Sagrario Camacho R.N.  Outcome: Met  11/4/2024 0953 by Sagrario Camacho R.N.  Outcome: Progressing  11/4/2024 0952 by Sagarrio Camacho R.N.  Outcome: Progressing     Problem: Knowledge Deficit - Standard  Goal: Patient and family/care givers will demonstrate understanding of plan of care, disease process/condition, diagnostic tests and medications  11/4/2024 1635 by Sagrario Camacho R.N.  Outcome: Met  11/4/2024 0953 by Sagrario Camacho R.N.  Outcome: Progressing  11/4/2024 0952 by Sagrario Camacho R.N.  Outcome: Progressing     Problem: Psychosocial  Goal: Patient's level of anxiety will decrease  11/4/2024 1635 by Sagrario Camacho R.N.  Outcome: Met  11/4/2024 0953 by Sagrario Camacho R.N.  Outcome: Progressing  11/4/2024 0952 by Sagrario Camacho R.N.  Reactivated  Goal: Patient's ability to verbalize feelings about condition will improve  11/4/2024 1635 by Sagrario Camacho R.N.  Outcome: Met  11/4/2024 0953 by Sagrario Camacho R.N.  Outcome: Progressing  11/4/2024 0952 by Sagrario Camacho R.N.  Reactivated  Goal: Patient's ability to re-evaluate and adapt role responsibilities will improve  11/4/2024 1635 by Sagrario Camacho R.N.  Outcome: Met  11/4/2024 0953 by Sagrario Camacho R.N.  Outcome: Progressing  11/4/2024 0952 by Sagrario Camacho R.N.  Reactivated  Goal: Patient and family will demonstrate ability to cope with life altering diagnosis and/or procedure  11/4/2024 1635 by Sagrario Camacho R.N.  Outcome: Met  11/4/2024 0953 by Sagrario Camacho R.N.  Outcome: Progressing  11/4/2024 0952 by Sagrario Camacho R.N.  Reactivated  Goal: Spiritual and cultural needs incorporated into  hospitalization  11/4/2024 1635 by Sagrario Camacho R.N.  Outcome: Met  11/4/2024 0953 by Sagrario Camacho R.N.  Outcome: Progressing  11/4/2024 0952 by Sagrario Camacho R.N.  Reactivated       Patient is not progressing towards the following goals:

## 2024-11-05 NOTE — CONSULTS
Chief Complaint: left abdominal pain    HPI: Stephanie Gail Schoenfeldt is a 48 y.o. female with a history of nephrolithiasis, having passed numerous stones and also having had 7 prior procedures for stone removal, who came into the ED very early this morning with severe left upper quadrant abdominal pain. She has felt some intermittent pain over the last few days, but the pain overnight was severe and different in location compared to what she's experienced with kidney stones in the past (all prior pain was flank pain). A CTAP demonstrated a left ureteral stone, as well as non-obstructive left renal stone.     She has had no fevers or chills, and her vital signs and lab evaluation is unremarkable. She has no dysuria, urgency, frequency, or gross hematuria. Urinalysis did have some bacteria, but no leukocytes and no nitrites.     After treatment with IV toradol she feels well. No current pain, and no nausea or vomiting.     She last had a ureteroscopy and laser lithotripsy about 3.5 years ago just before moving to [place] from [facility].     Past Medical History:  History reviewed. No pertinent past medical history.    Past Surgical History:  History reviewed. No pertinent surgical history.    Family History:  History reviewed. No pertinent family history.    Social History:  Social History     Socioeconomic History    Marital status: Single     Spouse name: Not on file    Number of children: Not on file    Years of education: Not on file    Highest education level: Not on file   Occupational History    Not on file   Tobacco Use    Smoking status: Never    Smokeless tobacco: Never   Vaping Use    Vaping status: Not on file   Substance and Sexual Activity    Alcohol use: Not on file    Drug use: Not on file    Sexual activity: Not on file   Other Topics Concern    Not on file   Social History Narrative    Not on file     Social Drivers of Health     Financial Resource Strain: Not on file   Food Insecurity: No  Food Insecurity (11/4/2024)    Hunger Vital Sign     Worried About Running Out of Food in the Last Year: Never true     Ran Out of Food in the Last Year: Never true   Transportation Needs: No Transportation Needs (11/4/2024)    PRAPARE - Transportation     Lack of Transportation (Medical): No     Lack of Transportation (Non-Medical): No   Physical Activity: Not on file   Stress: Not on file   Social Connections: Not on file   Intimate Partner Violence: Not At Risk (11/4/2024)    Humiliation, Afraid, Rape, and Kick questionnaire     Fear of Current or Ex-Partner: No     Emotionally Abused: No     Physically Abused: No     Sexually Abused: No   Housing Stability: Low Risk  (11/4/2024)    Housing Stability Vital Sign     Unable to Pay for Housing in the Last Year: No     Number of Times Moved in the Last Year: 0     Homeless in the Last Year: No       Medications:  Current Facility-Administered Medications   Medication Dose Route Frequency Provider Last Rate Last Admin    acetaminophen (Tylenol) tablet 650 mg  650 mg Oral Q6HRS PRFRANK Jackson M.D.   650 mg at 11/04/24 1033    ondansetron (Zofran) syringe/vial injection 4 mg  4 mg Intravenous Q4HRS OCTAVIO Jackson M.D.   4 mg at 11/04/24 0640    ondansetron (Zofran ODT) dispertab 4 mg  4 mg Oral Q4HRS PRFRANK Jackson M.D.        promethazine (Phenergan) tablet 12.5-25 mg  12.5-25 mg Oral Q4HRS OCTAVIO Jackson M.D.        promethazine (Phenergan) suppository 12.5-25 mg  12.5-25 mg Rectal Q4HRS PRFRANK Jackson M.D.        prochlorperazine (Compazine) injection 5-10 mg  5-10 mg Intravenous Q4HRS OCTAVIO Jackson M.D.        ketorolac (Toradol) 15 MG/ML injection 15 mg  15 mg Intravenous Q6HRS Sultana Jackson M.D.   15 mg at 11/04/24 1216    tamsulosin (Flomax) capsule 0.4 mg  0.4 mg Oral AFTER BREAKFAST Tani Sosa M.D.   0.4 mg at 11/04/24 1215    ceFAZolin (Ancef) 1 g in  mL  "IVPB  1 g Intravenous Q8HRS Tani Sosa M.D.   Stopped at 11/04/24 1257       Allergies:  Allergies   Allergen Reactions    Dilaudid [Hydromorphone] Vomiting    Morphine Unspecified     Chest pain       Review of Systems:  Constitutional: Negative for fever, chills and malaise/fatigue.   HENT: Negative for congestion.    Eyes: Negative for pain.   Respiratory: Negative for cough and shortness of breath.    Cardiovascular: Negative for leg swelling.   Gastrointestinal: Negative for nausea, vomiting, abdominal pain and diarrhea.   Genitourinary: Negative for dysuria and hematuria.   Skin: Negative for rash.   Neurological: Negative for dizziness, focal weakness and headaches.   Endo/Heme/Allergies: Does not bruise/bleed easily.   Psychiatric/Behavioral: Negative for depression.  The patient is not nervous/anxious.        Physical Exam:  Vitals:    11/04/24 1104   BP: 125/78   Pulse: 74   Resp: 18   Temp: 36.9 °C (98.4 °F)   SpO2: 94%       GENERAL: well appearing, well nourished, NAD  RESP: respiratory effort normal  ABDOMEN: soft, nontender, nondistended, no masses or organomegaly. No CVA tenderness.   SKIN/LYMPH: normal coloration and turgor, no suspicious skin lesions noted  NEURO/PSYCH: alert, oriented, normal speech, no focal findings or movement disorder noted  EXTREMITIES: no pedal edema noted    Data Review:    Labs:  POCT UA No results found for: \"POCCOLOR\", \"POCAPPEAR\", \"POCLEUKEST\", \"POCNITRITE\", \"POCUROBILIGE\", \"POCPROTEIN\", \"POCURPH\", \"POCBLOOD\", \"POCSPGRV\", \"POCKETONES\", \"POCBILIRUBIN\", \"POCGLUCUA\"   CBC   Lab Results   Component Value Date/Time    WBC 10.2 11/04/2024 0145    RBC 4.94 11/04/2024 0145    HEMOGLOBIN 15.2 11/04/2024 0145    HEMATOCRIT 44.7 11/04/2024 0145    MCV 90.5 11/04/2024 0145    MCH 30.8 11/04/2024 0145    MCHC 34.0 11/04/2024 0145    RDW 43.5 11/04/2024 0145    MPV 10.3 11/04/2024 0145    LYMPHOCYTES 22.10 11/04/2024 0145    LYMPHS 2.26 11/04/2024 0145    MONOCYTES 8.30 " 11/04/2024 0145    MONOS 0.85 11/04/2024 0145    EOSINOPHILS 3.00 11/04/2024 0145    EOS 0.31 11/04/2024 0145    BASOPHILS 0.70 11/04/2024 0145    BASO 0.07 11/04/2024 0145    NRBC 0.00 11/04/2024 0145       CMP   Lab Results   Component Value Date/Time    SODIUM 136 11/04/2024 0145    POTASSIUM 3.6 11/04/2024 0145    CHLORIDE 104 11/04/2024 0145    CO2 21 11/04/2024 0145    ANION 11.0 11/04/2024 0145    GLUCOSE 105 (H) 11/04/2024 0145    BUN 12 11/04/2024 0145    CREATININE 0.76 11/04/2024 0145    GFRCKD 97 11/04/2024 0145    CALCIUM 9.7 11/04/2024 0145    CORRCALC 9.5 11/04/2024 0145    ASTSGOT 16 11/04/2024 0145    ALTSGPT 14 11/04/2024 0145    ALKPHOSPHAT 114 (H) 11/04/2024 0145    TBILIRUBIN 0.5 11/04/2024 0145    ALBUMIN 4.2 11/04/2024 0145    TOTPROTEIN 7.5 11/04/2024 0145    GLOBULIN 3.3 11/04/2024 0145    AGRATIO 1.3 11/04/2024 0145       Imaging:   CT-ABDOMEN-PELVIS WITH  Order: [id number]   Status: Final result       Visible to patient: Yes (seen)       Next appt: None    0 Result Notes  Details    Reading Physician Reading Date Result Priority   Curry Coon M.D.  [phone] 11/4/2024      Narrative & Impression     11/4/2024 3:02 AM     HISTORY/REASON FOR EXAM:  Left upper quadrant pain, left flank pain.        TECHNIQUE/EXAM DESCRIPTION:   CT scan of the abdomen and pelvis with contrast.     Contrast-enhanced helical scanning was obtained from the diaphragmatic domes through the pubic symphysis following the bolus administration of nonionic contrast without complication.  100 mL of Omnipaque 350 nonionic contrast was administered without complication. Low dose optimization technique was utilized for this CT exam including automated exposure control and adjustment of the mA and/or kV according to patient size.     COMPARISON: No prior studies available.     FINDINGS:  Lung: Visualized lung bases are clear.  Liver: No focal liver lesion  Spleen: Normal in size, without focal lesion  Adrenal  glands: Normal  Pancreas: Normal  Gallbladder: No radiodense stone visualized.  Biliary: No biliary duct dilation visualized.  Kidneys: There is a 5 mm stone in the proximal left ureter. Minimal hydronephrosis. There is a 6 mm nonobstructing left upper pole stone.  Vasculature: Nonaneurysmal  Lymph nodes: No adenopathy  Bowel: No evidence of obstruction or acute inflammation  Appendix:  Normal  Peritoneum: No ascites  Pelvis: Urinary bladder is grossly normal. No pelvic mass or adenopathy.  Musculoskeletal: There are a few sclerotic foci within the pelvis and L1 vertebral body.     IMPRESSION:        1. 5 mm proximal left ureteral stone.  2. 6 mm nonobstructing left renal stone.  3. There are few sclerotic foci within the spine and pelvis, bone islands versus other. Recommend follow-up bone scan.       Assessment: 48 y.o. female with a history of recurrent calcium stones requiring 7 prior stone procedures as well as some spontaneous passage, here with acute pain secondary to an obstructing left ureteral stone. It is narrow in appearance (likely 2-3 mm) but about 5 mm long, with mild hydronephrosis proximal to the stone. She feels well now after some ketorolac, is non-toxic in appearance, and has no evidence for systemic inflammation or infection.     We discussed addressing her current stones with ureteroscopy and laser lithotripsy vs an attempt at spontaneous passage with medical expulsive therapy. Given some prior experiences with ureteroscopy, she would very much like to avoid surgery if possible. There are no emergent indications for the procedure, and she is comfortable with ketorolac, so we agreed to schedule surgery tentatively but give her a few weeks to attempt to pass the stone.     We also discussed dietary and fluid recommendations to help limit stone formation and growth, as well as medical therapy including K citrate and possible thiazide diuretics. After this current stone passes we will repeat a  metabolic evaluation to better evaluate how to decrease her future risk.      Plan:    -Ok to discharge home on:  -Tamsulosin 0.4 mg daily, taken before bedtime  -PRN PO toradol  -Potassium citrate, 15 MeQ PO BID with meals  -Urine strainer to catch stone if passed    -Will tentatively schedule for cystoscopy and left ureteroscopy with laser lithotripsy; our office will call to schedule. If stone is passed prior to that time we can cancel the procedure, or proceed to remove the larger renal stone if she agrees.       Craig Nathan MD

## 2024-11-06 LAB
BACTERIA UR CULT: NORMAL
SIGNIFICANT IND 70042: NORMAL
SITE SITE: NORMAL
SOURCE SOURCE: NORMAL

## 2024-11-12 ENCOUNTER — APPOINTMENT (OUTPATIENT)
Dept: ADMISSIONS | Facility: MEDICAL CENTER | Age: 49
End: 2024-11-12
Attending: UROLOGY
Payer: COMMERCIAL

## 2024-11-26 ENCOUNTER — PATIENT MESSAGE (OUTPATIENT)
Dept: UROLOGY | Facility: MEDICAL CENTER | Age: 49
End: 2024-11-26
Payer: COMMERCIAL

## 2024-11-26 DIAGNOSIS — N20.0 NEPHROLITHIASIS: ICD-10-CM

## 2024-11-27 ENCOUNTER — PHARMACY VISIT (OUTPATIENT)
Dept: PHARMACY | Facility: MEDICAL CENTER | Age: 49
End: 2024-11-27
Payer: COMMERCIAL

## 2024-11-27 PROCEDURE — RXMED WILLOW AMBULATORY MEDICATION CHARGE: Performed by: INTERNAL MEDICINE

## 2025-01-03 ENCOUNTER — APPOINTMENT (OUTPATIENT)
Dept: RADIOLOGY | Facility: MEDICAL CENTER | Age: 50
End: 2025-01-03
Attending: UROLOGY
Payer: COMMERCIAL

## 2025-02-01 ENCOUNTER — HOSPITAL ENCOUNTER (OUTPATIENT)
Dept: RADIOLOGY | Facility: MEDICAL CENTER | Age: 50
End: 2025-02-01
Attending: UROLOGY
Payer: COMMERCIAL

## 2025-02-01 DIAGNOSIS — N20.0 NEPHROLITHIASIS: ICD-10-CM

## 2025-02-01 PROCEDURE — 76775 US EXAM ABDO BACK WALL LIM: CPT

## 2025-02-11 ENCOUNTER — APPOINTMENT (OUTPATIENT)
Dept: UROLOGY | Facility: MEDICAL CENTER | Age: 50
End: 2025-02-11
Payer: COMMERCIAL

## 2025-02-11 DIAGNOSIS — N20.0 NEPHROLITHIASIS: ICD-10-CM

## 2025-02-11 PROCEDURE — 99214 OFFICE O/P EST MOD 30 MIN: CPT | Performed by: UROLOGY

## 2025-02-11 NOTE — PROGRESS NOTES
Chief Complaint: nephrolithiasis    HPI: Stephanie Gail Schoenfeldt is a 49 y.o. female with a history of nephrolithiasis, with prior passage of stones as well as numerous left ureteroscopies and laser lithotripsy for a large stone burden about 4 years ago (2021) prior to moving from [place] to [place].    She recently passed a left ureteral stone, and is here today for follow up.     She feels well at this time without flank pain.     CT in November 2024 (initial presentation here with renal colic) demonstrated not only a 4-5 mm stone in the left ureter, but also 5-6 mm stone in the upper pole of the left kidney, and a smaller 3 mm non-obstructive stone in the right kidney.     Recent renal ultrasound demonstrates no hydronephrosis, but the left upper pole stone appears to remain in place.     She reports recent bladder pain while taking potassium citrate. When stopping the medication, the pain resolved.     Past Medical History:  History reviewed. No pertinent past medical history.    Past Surgical History:  History reviewed. No pertinent surgical history.    Family History:  History reviewed. No pertinent family history.    Social History:  Social History     Socioeconomic History    Marital status:      Spouse name: Not on file    Number of children: Not on file    Years of education: Not on file    Highest education level: Not on file   Occupational History    Not on file   Tobacco Use    Smoking status: Never    Smokeless tobacco: Never   Vaping Use    Vaping status: Not on file   Substance and Sexual Activity    Alcohol use: Not on file    Drug use: Not on file    Sexual activity: Not on file   Other Topics Concern    Not on file   Social History Narrative    Not on file     Social Drivers of Health     Financial Resource Strain: Not on file   Food Insecurity: No Food Insecurity (11/4/2024)    Hunger Vital Sign     Worried About Running Out of Food in the Last Year: Never true     Ran Out of Food in  the Last Year: Never true   Transportation Needs: No Transportation Needs (11/4/2024)    PRAPARE - Transportation     Lack of Transportation (Medical): No     Lack of Transportation (Non-Medical): No   Physical Activity: Not on file   Stress: Not on file   Social Connections: Not on file   Intimate Partner Violence: Not At Risk (11/4/2024)    Humiliation, Afraid, Rape, and Kick questionnaire     Fear of Current or Ex-Partner: No     Emotionally Abused: No     Physically Abused: No     Sexually Abused: No   Housing Stability: Low Risk  (11/4/2024)    Housing Stability Vital Sign     Unable to Pay for Housing in the Last Year: No     Number of Times Moved in the Last Year: 0     Homeless in the Last Year: No       Medications:  Current Outpatient Medications   Medication Sig Dispense Refill    acetaminophen (TYLENOL) 500 MG Tab Take 500 mg by mouth every 6 hours as needed. Indications: Pain       No current facility-administered medications for this visit.       Allergies:  Allergies   Allergen Reactions    Dilaudid [Hydromorphone] Vomiting    Morphine Unspecified     Chest pain       Review of Systems:  Constitutional: Negative for fever, chills and malaise/fatigue.   HENT: Negative for congestion.    Eyes: Negative for pain.   Respiratory: Negative for cough and shortness of breath.    Cardiovascular: Negative for leg swelling.   Gastrointestinal: Negative for nausea, vomiting, abdominal pain and diarrhea.   Genitourinary: Negative for dysuria and hematuria.   Skin: Negative for rash.   Neurological: Negative for dizziness, focal weakness and headaches.   Endo/Heme/Allergies: Does not bruise/bleed easily.   Psychiatric/Behavioral: Negative for depression.  The patient is not nervous/anxious.        Physical Exam:  There were no vitals filed for this visit.    GENERAL: well appearing, well nourished, NAD  RESP: respiratory effort normal  ABDOMEN: soft, nontender, nondistended, no masses or organomegaly  SKIN/LYMPH:  "normal coloration and turgor, no suspicious skin lesions noted  NEURO/PSYCH: alert, oriented, normal speech, no focal findings or movement disorder noted  EXTREMITIES: no pedal edema noted    Data Review:    Labs:  POCT UA No results found for: \"POCCOLOR\", \"POCAPPEAR\", \"POCLEUKEST\", \"POCNITRITE\", \"POCUROBILIGE\", \"POCPROTEIN\", \"POCURPH\", \"POCBLOOD\", \"POCSPGRV\", \"POCKETONES\", \"POCBILIRUBIN\", \"POCGLUCUA\"   CBC   Lab Results   Component Value Date/Time    WBC 10.2 11/04/2024 0145    RBC 4.94 11/04/2024 0145    HEMOGLOBIN 15.2 11/04/2024 0145    HEMATOCRIT 44.7 11/04/2024 0145    MCV 90.5 11/04/2024 0145    MCH 30.8 11/04/2024 0145    MCHC 34.0 11/04/2024 0145    RDW 43.5 11/04/2024 0145    MPV 10.3 11/04/2024 0145    LYMPHOCYTES 22.10 11/04/2024 0145    LYMPHS 2.26 11/04/2024 0145    MONOCYTES 8.30 11/04/2024 0145    MONOS 0.85 11/04/2024 0145    EOSINOPHILS 3.00 11/04/2024 0145    EOS 0.31 11/04/2024 0145    BASOPHILS 0.70 11/04/2024 0145    BASO 0.07 11/04/2024 0145    NRBC 0.00 11/04/2024 0145       CMP   Lab Results   Component Value Date/Time    SODIUM 136 11/04/2024 0145    POTASSIUM 3.6 11/04/2024 0145    CHLORIDE 104 11/04/2024 0145    CO2 21 11/04/2024 0145    ANION 11.0 11/04/2024 0145    GLUCOSE 105 (H) 11/04/2024 0145    BUN 12 11/04/2024 0145    CREATININE 0.76 11/04/2024 0145    GFRCKD 97 11/04/2024 0145    CALCIUM 9.7 11/04/2024 0145    CORRCALC 9.5 11/04/2024 0145    ASTSGOT 16 11/04/2024 0145    ALTSGPT 14 11/04/2024 0145    ALKPHOSPHAT 114 (H) 11/04/2024 0145    TBILIRUBIN 0.5 11/04/2024 0145    ALBUMIN 4.2 11/04/2024 0145    TOTPROTEIN 7.5 11/04/2024 0145    GLOBULIN 3.3 11/04/2024 0145    AGRATIO 1.3 11/04/2024 0145       Imaging:   US-RENAL  Order: [id number]   Status: Final result       Visible to patient: Yes (seen)       Next appt: None       Dx: Nephrolithiasis    0 Result Notes  Details    Reading Physician Reading Date Result Priority   Dhaval Ray M.D.  [phone] 2/1/2025      Narrative & " Impression     2/1/2025 4:26 PM     HISTORY/REASON FOR EXAM:  surveillance of nephrolithiasis        TECHNIQUE/EXAM DESCRIPTION:  Renal ultrasound.     COMPARISON:  CT dated 11/4/2024     FINDINGS:     The right kidney measures 10.08 cm.  The right kidney appears normal in contour and parenchymal echotexture. The corticomedullary differentiation is preserved. The right renal collecting system is not dilated. No hydronephrosis. There is a 6 mm echogenic   focus in the lower pole which may represent a a stone.     The left kidney measures 10.83 cm. The left kidney appears normal in contour and parenchymal echotexture. The corticomedullary differentiation is preserved. The left renal collecting system is not dilated. No hydronephrosis. There is a 4 mm echogenic   focus in the upper pole and a 2 mm echogenic focus in the lower pole. These may represent small stones.     The bladder demonstrates no focal wall abnormality.           IMPRESSION:     1.  There are small echogenic foci noted bilaterally which may represent nonobstructing calculi.  2.  No hydronephrosis is evident.     CT-ABDOMEN-PELVIS WITH  Order: [id number]   Status: Final result       Visible to patient: Yes (seen)       Next appt: None    0 Result Notes  Details    Reading Physician Reading Date Result Priority   Curry Coon M.D.  [phone] 11/4/2024      Narrative & Impression     11/4/2024 3:02 AM     HISTORY/REASON FOR EXAM:  Left upper quadrant pain, left flank pain.        TECHNIQUE/EXAM DESCRIPTION:   CT scan of the abdomen and pelvis with contrast.     Contrast-enhanced helical scanning was obtained from the diaphragmatic domes through the pubic symphysis following the bolus administration of nonionic contrast without complication.  100 mL of Omnipaque 350 nonionic contrast was administered without complication. Low dose optimization technique was utilized for this CT exam including automated exposure control and adjustment of the mA  and/or kV according to patient size.     COMPARISON: No prior studies available.     FINDINGS:  Lung: Visualized lung bases are clear.  Liver: No focal liver lesion  Spleen: Normal in size, without focal lesion  Adrenal glands: Normal  Pancreas: Normal  Gallbladder: No radiodense stone visualized.  Biliary: No biliary duct dilation visualized.  Kidneys: There is a 5 mm stone in the proximal left ureter. Minimal hydronephrosis. There is a 6 mm nonobstructing left upper pole stone.  Vasculature: Nonaneurysmal  Lymph nodes: No adenopathy  Bowel: No evidence of obstruction or acute inflammation  Appendix:  Normal  Peritoneum: No ascites  Pelvis: Urinary bladder is grossly normal. No pelvic mass or adenopathy.  Musculoskeletal: There are a few sclerotic foci within the pelvis and L1 vertebral body.     IMPRESSION:        1. 5 mm proximal left ureteral stone.  2. 6 mm nonobstructing left renal stone.  3. There are few sclerotic foci within the spine and pelvis, bone islands versus other. Recommend follow-up bone scan.           Assessment: 49 y.o. female with a history of recurrent nephrolithiasis, with recent passage of left ureteral stone but a remaining 6 mm upper pole stone, as well as a 3 mm stone in the right kidney.    We discussed today both standard recommendations for stone prevention (see below), potential medical therapy beyond potassium citrate such as a thiazide diuretic, and also whether to proceed with elective left ureteroscopy and laser lithotripsy for that remaining upper pole stone.     -Increase fluid intake to target 2.5 liters of urine output daily; provided graduated cylinder to periodically measure urine output   -Limit dietary sodium (salt) intake to RDV or lower  -Avoid habits of frequent intake of oxalate-rich foods (beans, nuts, tea, spinach, kale, and more; see below references)  -Limit frequency of animal protein in diet  -Increase fruits and  vegetables    https://www.urologyhealth.org/educational-resources/kidney-stones    https://www.urologyhealth.org/educational-resources/kidney-stones-prevention    https://www.urologyhealth.org/educational-materials/kidney-cookbook    For now we'll repeat a metabolic evaluation (this was last done about 4 years ago), and she will think about whether to proceed with ureteroscopy for the left upper pole stone.   \  Plan:    -24 hour urine analysis  -CMP  -Will call with results and any further recommendations based on above studies  -Consider elective left ureteroscopy and laser lithotripsy for residual upper pole stone seen on ultrasound (and prior CT)  -If not planning on surgery, will arrange for follow up in about 6 months      Craig Nathan MD

## 2025-08-11 ENCOUNTER — APPOINTMENT (OUTPATIENT)
Dept: UROLOGY | Facility: MEDICAL CENTER | Age: 50
End: 2025-08-11
Payer: COMMERCIAL